# Patient Record
(demographics unavailable — no encounter records)

---

## 2025-01-28 NOTE — HISTORY OF PRESENT ILLNESS
[FreeTextEntry1] : 53-year-old woman with nausea and iron deficiency anemia.  She underwent a screening on December 18 that was low.  FIT test is negative.  EGD and biopsy was positive for H. pylori gastritis.

## 2025-01-28 NOTE — ASSESSMENT
[FreeTextEntry1] : H. pylori gastritis with nausea. Dietary and lifestyle modification discussed with the patient.  Patient will be treated with triple therapy consisting of amoxicillin, clarithromycin and omeprazole.  The medications were and directions were discussed with the patient.  She will return in 6 weeks for an H. pylori breath test to determine eradication of the bacteria.  She understands and all questions were answered. Today's care provided as part of continued longitudinal relationship to serve patient's ongoing need for healthcare services.

## 2025-01-28 NOTE — PHYSICAL EXAM
[None] : no edema [Normal] : normal bowel sounds, non-tender, no masses, soft, no no hepato-splenomegaly [No CVA Tenderness] : no CVA  tenderness [No Spinal Tenderness] : no spinal tenderness [No Joint Swelling] : no joint swelling seen [Normal Color / Pigmentation] : normal skin color and pigmentation [Motor Exam] : the motor exam was normal